# Patient Record
Sex: MALE | Race: WHITE
[De-identification: names, ages, dates, MRNs, and addresses within clinical notes are randomized per-mention and may not be internally consistent; named-entity substitution may affect disease eponyms.]

---

## 2018-01-30 ENCOUNTER — HOSPITAL ENCOUNTER (OUTPATIENT)
Dept: HOSPITAL 17 - NEPC | Age: 53
Setting detail: OBSERVATION
Discharge: HOME | End: 2018-01-30
Attending: INTERNAL MEDICINE | Admitting: INTERNAL MEDICINE
Payer: COMMERCIAL

## 2018-01-30 VITALS
TEMPERATURE: 98.1 F | DIASTOLIC BLOOD PRESSURE: 84 MMHG | RESPIRATION RATE: 16 BRPM | OXYGEN SATURATION: 100 % | HEART RATE: 69 BPM | SYSTOLIC BLOOD PRESSURE: 143 MMHG

## 2018-01-30 VITALS
OXYGEN SATURATION: 98 % | HEART RATE: 74 BPM | SYSTOLIC BLOOD PRESSURE: 110 MMHG | TEMPERATURE: 97.9 F | DIASTOLIC BLOOD PRESSURE: 67 MMHG | RESPIRATION RATE: 16 BRPM

## 2018-01-30 VITALS
OXYGEN SATURATION: 98 % | DIASTOLIC BLOOD PRESSURE: 73 MMHG | TEMPERATURE: 97.9 F | HEART RATE: 54 BPM | RESPIRATION RATE: 16 BRPM | SYSTOLIC BLOOD PRESSURE: 118 MMHG

## 2018-01-30 VITALS
HEART RATE: 82 BPM | OXYGEN SATURATION: 98 % | DIASTOLIC BLOOD PRESSURE: 78 MMHG | RESPIRATION RATE: 16 BRPM | SYSTOLIC BLOOD PRESSURE: 122 MMHG

## 2018-01-30 VITALS — DIASTOLIC BLOOD PRESSURE: 81 MMHG | SYSTOLIC BLOOD PRESSURE: 133 MMHG

## 2018-01-30 VITALS
SYSTOLIC BLOOD PRESSURE: 114 MMHG | RESPIRATION RATE: 20 BRPM | OXYGEN SATURATION: 97 % | TEMPERATURE: 97.7 F | DIASTOLIC BLOOD PRESSURE: 63 MMHG | HEART RATE: 78 BPM

## 2018-01-30 VITALS — RESPIRATION RATE: 16 BRPM | OXYGEN SATURATION: 98 %

## 2018-01-30 VITALS — OXYGEN SATURATION: 98 %

## 2018-01-30 DIAGNOSIS — E78.5: ICD-10-CM

## 2018-01-30 DIAGNOSIS — R07.9: Primary | ICD-10-CM

## 2018-01-30 DIAGNOSIS — Z82.3: ICD-10-CM

## 2018-01-30 DIAGNOSIS — R00.1: ICD-10-CM

## 2018-01-30 DIAGNOSIS — R94.31: ICD-10-CM

## 2018-01-30 LAB
BASOPHILS # BLD AUTO: 0 TH/MM3 (ref 0–0.2)
BASOPHILS NFR BLD: 0.5 % (ref 0–2)
BUN SERPL-MCNC: 16 MG/DL (ref 7–18)
CALCIUM SERPL-MCNC: 8.3 MG/DL (ref 8.5–10.1)
CHLORIDE SERPL-SCNC: 109 MEQ/L (ref 98–107)
CREAT SERPL-MCNC: 0.98 MG/DL (ref 0.6–1.3)
EOSINOPHIL # BLD: 0.3 TH/MM3 (ref 0–0.4)
EOSINOPHIL NFR BLD: 4.7 % (ref 0–4)
ERYTHROCYTE [DISTWIDTH] IN BLOOD BY AUTOMATED COUNT: 14 % (ref 11.6–17.2)
GFR SERPLBLD BASED ON 1.73 SQ M-ARVRAT: 80 ML/MIN (ref 89–?)
GLUCOSE SERPL-MCNC: 97 MG/DL (ref 74–106)
HCO3 BLD-SCNC: 25.6 MEQ/L (ref 21–32)
HCT VFR BLD CALC: 36.8 % (ref 39–51)
HGB BLD-MCNC: 13.2 GM/DL (ref 13–17)
INR PPP: 1 RATIO
LYMPHOCYTES # BLD AUTO: 1.5 TH/MM3 (ref 1–4.8)
LYMPHOCYTES NFR BLD AUTO: 20.2 % (ref 9–44)
MAGNESIUM SERPL-MCNC: 2.2 MG/DL (ref 1.5–2.5)
MCH RBC QN AUTO: 31 PG (ref 27–34)
MCHC RBC AUTO-ENTMCNC: 35.8 % (ref 32–36)
MCV RBC AUTO: 86.7 FL (ref 80–100)
MONOCYTE #: 0.6 TH/MM3 (ref 0–0.9)
MONOCYTES NFR BLD: 8.7 % (ref 0–8)
NEUTROPHILS # BLD AUTO: 4.9 TH/MM3 (ref 1.8–7.7)
NEUTROPHILS NFR BLD AUTO: 65.9 % (ref 16–70)
PLATELET # BLD: 186 TH/MM3 (ref 150–450)
PMV BLD AUTO: 7.9 FL (ref 7–11)
PROTHROMBIN TIME: 10 SEC (ref 9.8–11.6)
RBC # BLD AUTO: 4.24 MIL/MM3 (ref 4.5–5.9)
SODIUM SERPL-SCNC: 142 MEQ/L (ref 136–145)
TROPONIN I SERPL-MCNC: (no result) NG/ML (ref 0.02–0.05)
TROPONIN I SERPL-MCNC: (no result) NG/ML (ref 0.02–0.05)
WBC # BLD AUTO: 7.4 TH/MM3 (ref 4–11)

## 2018-01-30 PROCEDURE — 85025 COMPLETE CBC W/AUTO DIFF WBC: CPT

## 2018-01-30 PROCEDURE — 93005 ELECTROCARDIOGRAM TRACING: CPT

## 2018-01-30 PROCEDURE — 78452 HT MUSCLE IMAGE SPECT MULT: CPT

## 2018-01-30 PROCEDURE — 85610 PROTHROMBIN TIME: CPT

## 2018-01-30 PROCEDURE — 85730 THROMBOPLASTIN TIME PARTIAL: CPT

## 2018-01-30 PROCEDURE — 84484 ASSAY OF TROPONIN QUANT: CPT

## 2018-01-30 PROCEDURE — 99285 EMERGENCY DEPT VISIT HI MDM: CPT

## 2018-01-30 PROCEDURE — 96361 HYDRATE IV INFUSION ADD-ON: CPT

## 2018-01-30 PROCEDURE — G0378 HOSPITAL OBSERVATION PER HR: HCPCS

## 2018-01-30 PROCEDURE — 80048 BASIC METABOLIC PNL TOTAL CA: CPT

## 2018-01-30 PROCEDURE — 71045 X-RAY EXAM CHEST 1 VIEW: CPT

## 2018-01-30 PROCEDURE — 93017 CV STRESS TEST TRACING ONLY: CPT

## 2018-01-30 PROCEDURE — 83735 ASSAY OF MAGNESIUM: CPT

## 2018-01-30 PROCEDURE — 82552 ASSAY OF CPK IN BLOOD: CPT

## 2018-01-30 PROCEDURE — 96360 HYDRATION IV INFUSION INIT: CPT

## 2018-01-30 PROCEDURE — A9502 TC99M TETROFOSMIN: HCPCS

## 2018-01-30 PROCEDURE — 82550 ASSAY OF CK (CPK): CPT

## 2018-01-30 RX ADMIN — NITROGLYCERIN SCH MG: 0.4 TABLET SUBLINGUAL at 04:16

## 2018-01-30 RX ADMIN — NITROGLYCERIN SCH MG: 0.4 TABLET SUBLINGUAL at 07:00

## 2018-01-30 NOTE — TR
Date Performed: 01/30/2018       Time Performed: 09:28:55

 

DOCTOR:      Daja Velazquez 

 

DRUG LIST:     

CLINICAL HISTORY:     

REASON FOR TEST:     

REASON FOR ENDING:     

OBSERVATION:     

CONCLUSION:      MEL PROTOCOL. NO CP. TEST STOPPED AFTER EXCEEDING GOAL HR SECONDARY TO SOB AND LEG
 FATIGUE.Maximum VN=806  Max HR Achieved=97.0% Maximum UD=696/62 Total Exercise Time=9:38

COMMENTS:

## 2018-01-30 NOTE — HHI.HP
[facility]


Primary Care Physician


Jerry Leventhal, MD


Chief Complaint


Chest pain


History of Present Illness


This is a 52-year-old male that presents to ED via private vehicle with a 

complaint of being awoken at 3:00 this morning with chest discomfort.  

Described as a tightness to the left chest.  It was a 5-6 out of 10.  Lasted 1 

hour.  Denies shortness of breath, nausea, or diaphoresis.  The discomfort did 

not radiate.  Found nothing to worsen or improve the symptoms.  States he has 

not had this before.  Cannot recall ever having prior cardiac workup.  Denies 

recent illness.  Denies fevers or chills.





Review of Systems


General: Patient denies fevers, chills recent, and recent travel


HEENT: Patient denies headache, sore throat, difficulty swallowing.  


Cardiovascular: Has the chest discomfort as mentioned above.  Denies sensation 

of heart beating rapidly or irregularly.  No syncope.  Denies diaphoresis.


Respiratory: Denies shortness of breath or inspirational chest discomfort.  

Denies coughing wheezing or hemoptysis.  


GI: Patient denies nausea, vomiting, diarrhea, abdominal pain, bloody stools.


Musculoskeletal: Patient denies joint pain or edema.  Denies calf pain or edema.


Neurovascular: Patient denies numbness, tingling, weakness in extremities.  

Denies headache.


Endocrine: Denies polyuria and polydipsia.


Hematologic: Denies easy bruising.


Skin: Denies rash or itching.





Past Family Social History


Allergies:  


Coded Allergies:  


     penicillin G (Verified  Allergy, Severe, 18)


     phenobarbital (Verified  Allergy, Severe, 18)


     vancomycin (Verified  Allergy, Severe, 18)


Past Medical History


Small bowel obstruction 2016.  Denies hypertension, hyperlipidemia, 

diabetes, and known CAD.


Past Surgical History


Small bowel obstruction 2016.


Reported Medications





Reported Meds & Active Scripts


Active


Active Ordered Medications





Current Medications








 Medications


  (Trade)  Dose


 Ordered  Sig/Stefany


 Route  Start Time


 Stop Time Status Last Admin


 


 Sodium Chloride  1,000 ml @ 


 84 mls/hr  [id number]


 IV  18 04:00


    18 04:15


 


 


  (NS Flush)  2 ml  UNSCH  PRN


 IV FLUSH  18 05:15


     


 


 


  (NS Flush)  2 ml  BID


 IV FLUSH  18 09:00


     


 








Family History


Denies family history of CAD.  His father had CVAs.


Social History


Lifetime nonsmoker.  Denies alcohol or illicit drugs.





Physical Exam


Vital Signs





Vital Signs








  Date Time  Temp Pulse Resp B/P (MAP) Pulse Ox O2 Delivery O2 Flow Rate FiO2


 


18 07:10   16  98 Room Air  


 


18 07:10     98 Room Air  


 


18 07:05 97.9 58 16 110/67 (81) 98 Room Air  


 


18 07:05  56 16  98 Room Air  


 


18 05:18     98   21


 


18 05:03     98 Room Air  


 


18 05:02    132/81 (98)    





    133/81 (98)    


 


18 03:57     100 Room Air  


 


18 03:55  82 16 122/78 (93) 98   


 


18 03:34 98.1 69 16 143/84 (103) 100 Room Air  








Physical Exam


GENERAL: This is a well-nourished, well-developed patient, in no apparent 

distress.  Patient speaks in clear complete sentences.  Patient is pleasant.


HEENT: Head is atraumatic and normocephalic.  Neck is supple without 

lymphadenopathy and trachea is midline.  No JVD or carotid bruits.


CARDIOVASCULAR: Regular rate and rhythm without murmurs, gallops, or rubs. 


RESPIRATORY: Clear to auscultation. Breath sounds equal bilaterally. No wheezes

, rales, or rhonchi.  Chest wall is nontender.  No use of accessory muscles.


GASTROINTESTINAL: Abdomen is nontender, nondistended.  Abdomen soft.  No 

obvious pulsatile mass or bruit.  No CVA tenderness.  Strong femoral pulses 

bilaterally.  Normal bowel sounds in all quadrants.


MUSCULOSKELETAL: Patient is moving upper and lower extremities freely.  No calf 

tenderness or edema, no Homans sign.  Strong pulses in upper and lower 

extremities.


NEUROLOGICAL: Patient is alert and oriented.  Cranial nerves 2-12 are grossly 

intact.  No focal deficits and speech is clear.


SKIN: No rash and turgor is normal.


Laboratory





Laboratory Tests








Test


  18


04:10 18


07:11


 


White Blood Count 7.4  


 


Red Blood Count 4.24  


 


Hemoglobin 13.2  


 


Hematocrit 36.8  


 


Mean Corpuscular Volume 86.7  


 


Mean Corpuscular Hemoglobin 31.0  


 


Mean Corpuscular Hemoglobin


Concent 35.8 


  


 


 


Red Cell Distribution Width 14.0  


 


Platelet Count 186  


 


Mean Platelet Volume 7.9  


 


Neutrophils (%) (Auto) 65.9  


 


Lymphocytes (%) (Auto) 20.2  


 


Monocytes (%) (Auto) 8.7  


 


Eosinophils (%) (Auto) 4.7  


 


Basophils (%) (Auto) 0.5  


 


Neutrophils # (Auto) 4.9  


 


Lymphocytes # (Auto) 1.5  


 


Monocytes # (Auto) 0.6  


 


Eosinophils # (Auto) 0.3  


 


Basophils # (Auto) 0.0  


 


CBC Comment DIFF FINAL  


 


Differential Comment   


 


Prothrombin Time 10.0  


 


Prothromb Time International


Ratio 1.0 


  


 


 


Activated Partial


Thromboplast Time 24.8 


  


 


 


Blood Urea Nitrogen 16  


 


Creatinine 0.98  


 


Random Glucose 97  


 


Calcium Level 8.3  


 


Magnesium Level 2.2  


 


Sodium Level 142  


 


Potassium Level 3.8  


 


Chloride Level 109  


 


Carbon Dioxide Level 25.6  


 


Anion Gap 7  


 


Estimat Glomerular Filtration


Rate 80 


  


 


 


Total Creatine Kinase 102  90 


 


Creatine Kinase MB 1.9  


 


Troponin I LESS THAN 0.02  LESS THAN 0.02 








Result Diagram:  


18 0410





Imaging





Last 48 hours Impressions








Chest X-Ray 18 0355 Signed





Impressions: 





 Service Date/Time:  2018 04:28 - CONCLUSION:  1. No 

active 





 disease.     Baldomero Mares MD 








Course


EKGs have sinus bradycardia without significant ST segment depressions or 

elevations.  Inverted T waves in lead 3.





Caprini VTE Risk Assessment


Caprini VTE Risk Assessment:  No/Low Risk (score <= 1)


Caprini Risk Assessment Model











 Point Value = 1          Point Value = 2  Point Value = 3  Point Value = 5


 


Age 41-60


Minor surgery


BMI > 25 kg/m2


Swollen legs


Varicose veins


Pregnancy or postpartum


History of unexplained or recurrent


   spontaneous 


Oral contraceptives or hormone


   replacement


Sepsis (< 1 month)


Serious lung disease, including


   pneumonia (< 1 month)


Abnormal pulmonary function


Acute myocardial infarction


Congestive heart failure (< 1 month)


History of inflammatory bowel disease


Medical patient at bed rest Age 61-74


Arthroscopic surgery


Major open surgery (> 45 min)


Laparoscopic surgery (> 45 min)


Malignancy


Confined to bed (> 72 hours)


Immobilizing plaster cast


Central venous access Age >= 75


History of VTE


Family history of VTE


Factor V Leiden


Prothrombin 29935K


Lupus anticoagulant


Anticardiolipin antibodies


Elevated serum homocysteine


Heparin-induced thrombocytopenia


Other congenital or acquired


   thrombophilia Stroke (< 1 month)


Elective arthroplasty


Hip, pelvis, or leg fracture


Acute spinal cord injury (< 1 month)








Prophylaxis Regimen











   Total Risk


Factor Score Risk Level Prophylaxis Regimen


 


0-1      Low Early ambulation


 


2 Moderate Order ONE of the following:


*Sequential Compression Device (SCD)


*Heparin 5000 units SQ BID


 


3-4 Higher Order ONE of the following medications:


*Heparin 5000 units SQ TID


*Enoxaparin/Lovenox 40 mg SQ daily (WT < 150 kg, CrCl > 30 mL/min)


*Enoxaparin/Lovenox 30 mg SQ daily (WT < 150 kg, CrCl > 10-29 mL/min)


*Enoxaparin/Lovenox 30 mg SQ BID (WT < 150 kg, CrCl > 30 mL/min)


AND/OR


*Sequential Compression Device (SCD)


 


5 or more Highest Order ONE of the following medications:


*Heparin 5000 units SQ TID (Preferred with Epidurals)


*Enoxaparin/Lovenox 40 mg SQ daily (WT < 150 kg, CrCl > 30 mL/min)


*Enoxaparin/Lovenox 30 mg SQ daily (WT < 150 kg, CrCl > 10-29 mL/min)


*Enoxaparin/Lovenox 30 mg SQ BID (WT < 150 kg, CrCl > 30 mL/min)


AND


*Sequential Compression Device (SCD)











Assessment and Plan


Assessment and Plan


* Chest pain: Patient has had first 2 sets of cardiac enzymes and EKGs for 

ruling out purposes.  The second set was 6 hours after onset of his symptoms.  

He was seen by Dr. Velazquez cardiology and the chest and center.  Symptoms 

do not sound cardiac in nature but we will get a Charanjit protocol ETT and if that 

is nonischemic he will be charged and instructed to follow-up with his PCP.  

Return to ED for interval issues.


Patient is stable at this time.  He is agreeable to this plan.











Erik Long 2018 08:47

## 2018-01-30 NOTE — RADRPT
EXAM DATE/TIME:  01/30/2018 04:28 

 

[facility] COMPARISON:     

CHEST SINGLE AP, November 30, 2016, 8:48.

 

                     

INDICATIONS :     

Chest pain. 

                     

 

MEDICAL HISTORY :     

None.          

 

SURGICAL HISTORY :     

None.   

 

ENCOUNTER:     

Initial                                        

 

ACUITY:     

1 day      

 

PAIN SCORE:     

3/10

 

LOCATION:     

Bilateral chest 

 

FINDINGS:     

A single view of the chest demonstrates the lungs to be symmetrically aerated without evidence of mas
s, infiltrate or effusion.  The cardiomediastinal contours are unremarkable.  Osseous structures are 
intact.

 

CONCLUSION:     

1. No active disease.

 

 

 

 Baldomero Mares MD on January 30, 2018 at 5:08           

Board Certified Radiologist.

 This report was verified electronically.

## 2018-01-30 NOTE — TR
Date Performed: 01/30/2018       Time Performed: 11:31:48

 

DOCTOR:      Daja Velazquez 

 

DRUG LIST:     

CLINICAL HISTORY:     

REASON FOR TEST:     

REASON FOR ENDING:     

OBSERVATION:     

CONCLUSION:      NUC ETT, MEL PROTOCOL. NO CP OR SOB. Maximum ON=515 % Max HR Achieved=89.0% Maximu
m BP=160/78 Total Exercise Time=8:10

COMMENTS:

## 2018-01-30 NOTE — HHI.DCPOC
Discharge Care Plan


Diagnosis:  


(1) Chest pain


Goals to Promote Your Health


* To prevent worsening of your condition and complications


* To maintain your health at the optimal level


Directions to Meet Your Goals


*** Take your medications as prescribed


*** Follow your dietary instruction


*** Follow activity as directed








*** Keep your appointments as scheduled


*** Take your immunizations and boosters as scheduled


*** If your symptoms worsen call your PCP, if no PCP go to Urgent Care Center 

or Emergency Room***


*** Smoking is Dangerous to Your Health. Avoid second hand smoke***


***Call the 24-hour hour crisis hotline for domestic abuse at 1-404.762.2835***











Erik Long Jan 30, 2018 13:15

## 2018-01-30 NOTE — EKG
Date Performed: 2018       Time Performed: 07:11:16

 

PTAGE:      52 years

 

EKG:      SINUS BRADYCARDIA BORDERLINE LEFT AXIS DEVIATION MINIMAL VOLTAGE CRITERIA FOR LVH, CONSIDER
 NORMAL VARIANT BORDERLINE ECG Since 

 

 PREVIOUS TRACING            , no significant change noted PREVIOUS TRACIN2018 03.41

 

DOCTOR:   Daja Velazquez  Interpretating Date/Time  2018 16:13:02

## 2018-01-30 NOTE — EKG
Date Performed: 2018       Time Performed: 03:41:14

 

PTAGE:      52 years

 

EKG:      Sinus rhythm 

 

 BORDERLINE LEFT AXIS DEVIATION MODERATE INTRAVENTRICULAR CONDUCTION DELAY BORDERLINE ECG Since 

 

 PREVIOUS TRACING            , no significant change noted PREVIOUS TRACIN2016 08.07

 

DOCTOR:   Daja Velazquez  Interpretating Date/Time  2018 16:12:02

## 2018-01-30 NOTE — PD
HPI


Chief Complaint:  chest pain


Time Seen by Provider:  03:55


Travel History


International Travel<30 days:  No


Contact w/Intl Traveler<30days:  No


Traveled to known affect area:  No





History of Present Illness


HPI


52-year-old male presents to the emergency department by private transportation 

the care of family for evaluation of left-sided chest pain that radiates to the 

left shoulder and awakened him from sleep with a sharp stabbing pain at 2 AM.  

Patient states the pain resolved spontaneously and then returned and then more 

recently has become a 6/10 intensity dull discomfort to the left chest.  

Patient states no shortness of breath;no nausea no vomiting and no referred 

shoulder arm neck jaw or abdominal pain.  Patient has taken no medications 

prior to arrival to the emergency department and no aspirin use.  Patient 

denies personal history of CAD, hypertension, dyslipidemia, diabetes, or 

tobaccoism.  Patient does not report family history premature onset heart 

disease.  Father passed away with issues associated with CVA.  Patient denies 

any previous evaluation for cardiac disease or for chest pain.  Last surgery 

was December 2016.  No recent long distance travel protracted bedrest her 

surgical procedure.  No pleuritic chest pain or shortness of breath or 

hemoptysis.  Patient denies any injury.  Patient is able to identify 

exacerbating or alleviating factors.





PFSH


Past Medical History


*** Narrative Medical


Review of medical record identifies dyslipidemia; nursing notes reviewed


Cardiovascular Problems:  Yes (CHOL)


High Cholesterol:  Yes


Diminished Hearing:  No





Social History


Alcohol Use:  Yes (RARELY)


Tobacco Use:  No


Substance Use:  No





Allergies-Medications


(Allergen,Severity, Reaction):  


Coded Allergies:  


     penicillin G (Verified  Allergy, Severe, 1/30/18)


     phenobarbital (Verified  Allergy, Severe, 1/30/18)


     vancomycin (Verified  Allergy, Severe, 1/30/18)


Reported Meds & Prescriptions





Reported Meds & Active Scripts


Active








Review of Systems


Except as stated in HPI:  all other systems reviewed are Neg





Physical Exam


Narrative


GENERAL: Well-developed well-nourished male in no acute distress no respiratory 

distress


SKIN: Warm and dry.


HEAD: Normocephalic.


EYES: No scleral icterus. No injection or drainage. 


NECK: Supple, trachea midline. No JVD or lymphadenopathy.


CARDIOVASCULAR: Regular rate and rhythm without murmurs, gallops, or rubs.  

Chest wall: Nontender to direct palpation.


RESPIRATORY: Breath sounds equal bilaterally. No accessory muscle use.


GASTROINTESTINAL: Abdomen soft, non-tender, nondistended. 


MUSCULOSKELETAL: No cyanosis, or edema. 


BACK: Nontender without obvious deformity. No CVA tenderness.








Data


Data


Last Documented VS





Vital Signs








  Date Time  Temp Pulse Resp B/P (MAP) Pulse Ox O2 Delivery O2 Flow Rate FiO2


 


1/30/18 03:57     100 Room Air  


 


1/30/18 03:55  82 16 122/78 (93)    


 


1/30/18 03:34 98.1       








Orders





 Orders


Electrocardiogram (1/30/18 03:55)


Basic Metabolic Panel (Bmp) (1/30/18 03:55)


Ckmb (Isoenzyme) Profile (1/30/18 03:55)


Complete Blood Count With Diff (1/30/18 03:55)


Magnesium (Mg) (1/30/18 03:55)


Prothrombin Time / Inr (Pt) (1/30/18 03:55)


Act Partial Throm Time (Ptt) (1/30/18 03:55)


Troponin I (1/30/18 03:55)


Chest, Single Ap (1/30/18 03:55)


Ecg Monitoring (1/30/18 03:55)


Bilateral Bp Monitoring (1/30/18 03:55)


Iv Access Insert/Monitor (1/30/18 03:55)


Oximetry (1/30/18 03:55)


Oxygen Administration (1/30/18 03:55)


Aspirin Chew (Aspirin Chew) (1/30/18 04:00)


Sodium Chloride 0.9% Flush (Ns Flush) (1/30/18 04:00)


Nitroglycerin Sl (Nitrostat Sl) (1/30/18 04:00)


Sodium Chlor 0.9% 1000 Ml Inj (Ns 1000 M (1/30/18 04:00)


CKMB (1/30/18 04:10)


CKMB% (1/30/18 04:10)





Labs





Laboratory Tests








Test


  1/30/18


04:10


 


White Blood Count 7.4 TH/MM3 


 


Red Blood Count 4.24 MIL/MM3 


 


Hemoglobin 13.2 GM/DL 


 


Hematocrit 36.8 % 


 


Mean Corpuscular Volume 86.7 FL 


 


Mean Corpuscular Hemoglobin 31.0 PG 


 


Mean Corpuscular Hemoglobin


Concent 35.8 % 


 


 


Red Cell Distribution Width 14.0 % 


 


Platelet Count 186 TH/MM3 


 


Mean Platelet Volume 7.9 FL 


 


Neutrophils (%) (Auto) 65.9 % 


 


Lymphocytes (%) (Auto) 20.2 % 


 


Monocytes (%) (Auto) 8.7 % 


 


Eosinophils (%) (Auto) 4.7 % 


 


Basophils (%) (Auto) 0.5 % 


 


Neutrophils # (Auto) 4.9 TH/MM3 


 


Lymphocytes # (Auto) 1.5 TH/MM3 


 


Monocytes # (Auto) 0.6 TH/MM3 


 


Eosinophils # (Auto) 0.3 TH/MM3 


 


Basophils # (Auto) 0.0 TH/MM3 


 


CBC Comment DIFF FINAL 


 


Differential Comment  


 


Prothrombin Time 10.0 SEC 


 


Prothromb Time International


Ratio 1.0 RATIO 


 


 


Activated Partial


Thromboplast Time 24.8 SEC 


 


 


Blood Urea Nitrogen 16 MG/DL 


 


Creatinine 0.98 MG/DL 


 


Random Glucose 97 MG/DL 


 


Calcium Level 8.3 MG/DL 


 


Magnesium Level 2.2 MG/DL 


 


Sodium Level 142 MEQ/L 


 


Potassium Level 3.8 MEQ/L 


 


Chloride Level 109 MEQ/L 


 


Carbon Dioxide Level 25.6 MEQ/L 


 


Anion Gap 7 MEQ/L 


 


Estimat Glomerular Filtration


Rate 80 ML/MIN 


 


 


Total Creatine Kinase 102 U/L 


 


Creatine Kinase MB 1.9 NG/ML 


 


Troponin I


  LESS THAN 0.02


NG/ML











MDM


Medical Decision Making


Medical Screen Exam Complete:  Yes


Emergency Medical Condition:  Yes


Medical Record Reviewed:  Yes


Interpretation(s)


EKG normal sinus rhythm rate 67 interventricular conduction delay no acute ST 

elevation or injury pattern


CK not elevated troponin I less than 0.02, not elevated


Vital Signs








  Date Time  Temp Pulse Resp B/P (MAP) Pulse Ox O2 Delivery O2 Flow Rate FiO2


 


1/30/18 03:57     100 Room Air  


 


1/30/18 03:55  82 16 122/78 (93) 98   


 


1/30/18 03:34 98.1 69 16 143/84 (103) 100 Room Air  





CBC & BMP Diagram


1/30/18 04:10








Calcium Level 8.3 L, Magnesium Level 2.2





CXR: no lobar infiltrate


Differential Diagnosis


Chest pain, ACS, MI, atypical chest pain, aortic dissection, aneurysm, biliary 

colic, pancreatitis


Narrative Course


Patient placed on cardiac monitor with continuous pulse oximetry IV access 

obtained specimens collected and sent for resulting EKG performed which reveals 

no acute ST elevation or injury pattern; patient  aspirin 162 mg by 

mouth as well as sublingual nitroglycerin


At 4:55 AM chest pain is 0/10 in intensity; chest x-ray no acute process; CK 

102 not elevated and troponin I less than 0.02 not elevated


Plan will be admitted to admit patient to chest pain center per protocol


Patient informed of lab results in stable for chest pain center protocol





Physician Communication


Physician Communication


[facility] OBS protocol





Diagnosis





 Primary Impression:  


 Chest pain





Admitting Information


Admitting Physician Requests:  Observation











Cyndi Long MD Jan 30, 2018 03:58

## 2018-01-30 NOTE — RADRPT
EXAM DATE/TIME:  01/30/2018 11:08 

 

[facility] COMPARISON:     

No previous studies available for comparison.

 

 

INDICATIONS :      

Left chest pain. Angina 

                       

 

DOSE:      

27.2 mCi Tc99m Myoview at stress

                     8.6  mCi Tc99m Myoview at rest

                       

                       

 

 REST HEART RATE:     

96 BPM

 

TARGET HEART RATE:      

141 BPM

 

MAX HEART RATE:      

148 BPM

 

REST BLOOD PRESSURE:     

110/64 mmHg

 

MAX BLOOD PRESSURE:      

140/78 mmHg

 

EJECTION FRACTION:      

55%

                       

                       

 

MEDICAL HISTORY :     

Hypercholesterolemia.   

 

SURGICAL HISTORY :       

None.  

 

ENCOUNTER:     

Initial

 

ACUITY:     

1 day

 

PAIN SCALE:     

5/10

 

LOCATION:      

Left chest 

 

TECHNIQUE:     

The patient underwent upright treadmill exercise in the chest pain center.  Continuous ECG tracing wa
s monitored during stress.  Gated SPECT imaging was performed after stress, and conventional SPECT im
aging was performed at rest.  The examination was performed on a SPECT/CT scanner, both attenuation-c
orrected and non-corrected datasets were reviewed.

 

FINDINGS:     

 

DISTRIBUTION:     

The maximum perfused segment at stress is in the anterior lateral wall.

 

PERFUSION STUDY:     

The pattern of perfusion at stress is within normal limits.

 

GATED STUDY:     

There is intact wall motion and thickening without hypokinetic or dyskinetic segments. 

 

CONCLUSION:     

Normal examination.  

 

RISK CATEGORY:      Low (<1% Annual Mortality Rate)

 

 

 

 

 Vladimir Jay MD on January 30, 2018 at 13:02           

Board Certified Radiologist.

 This report was verified electronically.